# Patient Record
Sex: FEMALE | Race: BLACK OR AFRICAN AMERICAN | Employment: UNEMPLOYED | URBAN - METROPOLITAN AREA
[De-identification: names, ages, dates, MRNs, and addresses within clinical notes are randomized per-mention and may not be internally consistent; named-entity substitution may affect disease eponyms.]

---

## 2017-12-17 ENCOUNTER — HOSPITAL ENCOUNTER (EMERGENCY)
Age: 52
Discharge: HOME | End: 2017-12-17
Attending: EMERGENCY MEDICINE
Payer: MEDICARE

## 2017-12-17 VITALS
DIASTOLIC BLOOD PRESSURE: 140 MMHG | RESPIRATION RATE: 20 BRPM | SYSTOLIC BLOOD PRESSURE: 172 MMHG | OXYGEN SATURATION: 99 % | TEMPERATURE: 98.4 F | HEART RATE: 92 BPM

## 2017-12-17 DIAGNOSIS — R51.9 ACUTE INTRACTABLE HEADACHE, UNSPECIFIED HEADACHE TYPE: Primary | ICD-10-CM
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
## 2017-12-17 DIAGNOSIS — I10 HYPERTENSION, UNSPECIFIED TYPE: ICD-10-CM

## 2017-12-17 PROCEDURE — 99284 EMERGENCY DEPT VISIT MOD MDM: CPT

## 2017-12-17 PROCEDURE — 6370000000 HC RX 637 (ALT 250 FOR IP): Performed by: EMERGENCY MEDICINE

## 2017-12-17 RX ORDER — ACETAMINOPHEN 325 MG/1
650 TABLET ORAL ONCE
Status: COMPLETED | OUTPATIENT
Start: 2017-12-17 | End: 2017-12-17

## 2017-12-17 RX ADMIN — ACETAMINOPHEN 650 MG: 325 TABLET ORAL at 07:29

## 2017-12-17 ASSESSMENT — PAIN DESCRIPTION - PAIN TYPE: TYPE: ACUTE PAIN

## 2017-12-17 ASSESSMENT — PAIN DESCRIPTION - LOCATION: LOCATION: HEAD

## 2017-12-17 ASSESSMENT — PAIN SCALES - GENERAL
PAINLEVEL_OUTOF10: 8
PAINLEVEL_OUTOF10: 8

## 2017-12-17 NOTE — PROGRESS NOTES
I did not see or evaluate this patient. I clicked on this chart in [address].     Jordana Ontiveros DO  Emergency Medicine Resident

## 2017-12-17 NOTE — ED NOTES
Per [facility], [facility] doesn't have January schedule & [facility] scheduling out until late January. Writer provided patient [facility] clinic appointment card, advised patient to contact them directly for earlier appointment. Patient denies additional needs.       Valente HEIKE [place], [place]  12/17/17 0188

## 2017-12-17 NOTE — ED PROVIDER NOTES
[facility]  ED  Emergency Department Encounter  Emergency Medicine Resident     Pt Name: Katiana Santa  MRN: [id number]  [place] 1965  Date of evaluation: 12/17/17  PCP:  No primary care provider on file. CHIEF COMPLAINT       Chief Complaint   Patient presents with    Headache     posterior head pain s/p slip and fall on ice at a gas station. Pt reports hitting the back of her head a metal pole. No LOC       HISTORY OF PRESENT ILLNESS  (Location/Symptom, Timing/Onset, Context/Setting, Quality, Duration, Modifying Factors, Severity.)      Katiana Santa is a 46 y.o. female who presents with an acute mechanical fall that happened an hours ago. Patient states that they tripped on ice causing them to hit the back of the head. Denied loss of consciousness, chest pain, shortness of breath, palpitations, nausea, vomiting, numbness, tingling, burning, weakness, headaches, blurred vision, being on blood thinners. Since the fall patient complains of mild sharp pain  At the location of where she hit her head that is has a constant baseline component with acute intermittent flares is aggravated by pressure or movement and relieved by nothing. Patient has taken nothing without adequate relief. Patient has ambulated. Patient denies not acting appropriate. Headache is mild at onset and the maximum initially    PAST MEDICAL / SURGICAL / SOCIAL / FAMILY HISTORY     I reviewed the past medical history and past surgical history and is non contributory unless noted above does take daily ASA      Social History     Social History    Marital status: Single     Spouse name: N/A    Number of children: N/A    Years of education: N/A     Occupational History    Not on file.      Social History Main Topics    Smoking status: Not on file    Smokeless tobacco: Not on file    Alcohol use No    Drug use: No    Sexual activity: Not on file     Other Topics Concern    Not on file     Social History and Oriented x3,   Skin: Warm and dry  Psych: Mood/affect normal, behavior normal    DIFFERENTIAL  DIAGNOSIS     PLAN (LABS / IMAGING / EKG):  Orders Placed This Encounter   Procedures    Inpatient consult to Social Work       MEDICATIONS ORDERED:  Orders Placed This Encounter   Medications    acetaminophen (TYLENOL) tablet 650 mg       DDX: Fracture, sprain, strain    DIAGNOSTIC RESULTS / EMERGENCY DEPARTMENT COURSE / MDM     LABS:  No results found for this visit on 12/17/17. IMPRESSION: Mechanical Fall    RADIOLOGY:  No results found. EKG  None     All EKG's are interpreted by the Emergency Department Physician who either signs or Co-signs this chart in the absence of a cardiologist.    EMERGENCY DEPARTMENT COURSE:  Pre-hypertension/Hypertension: You have been informed that you may have pre-hypertension or Hypertension based on a blood pressure reading in the emergency department. I recommend you call the primary care provider listed on your discharge instructions or a physician of your choice this week to arrange follow up for further evaluation of possible pre-hypertension or Hypertension. Social work consult to the patient having a primary care physician found to have hypertension. Social work saw evaluated and treated patient gave her PCP follow-up    I treated the patient's pain with Tylenol or strict return precautions including if she having difficulty with words having difficulty walking having nausea vomiting. I gave her concussion information    [place] [facility]   Imaging is not required since GCS is not <15, there is not suspected open or depressed skull fracture or signs of basilar skull fracture, >2 episodes of vomiting, and age between 13-73. This test is almost 100% sensitive for identifying head injuries that require neurosurgical intervention. There is still a slight risk but these patients continued to have worsening of symptoms and returned back to the department.  Thus it is important for you to return if you have a worsening of headache, nausea, vomiting, changes in vision or motor or sensory weakness. PROCEDURES:  None    CONSULTS:  IP CONSULT TO SOCIAL WORK    CRITICAL CARE:  None    FINAL IMPRESSION      1. Acute intractable headache, unspecified headache type    2. Accident due to mechanical fall without injury, initial encounter    3. Hypertension, unspecified type          DISPOSITION / PLAN       Use an ice pack or bag filled with ice and apply to the injured area 3 - 4 times a day for 15 - 20 minutes each time. If the injury is older than 3 days, then use a heating pad to help relax the muscles. Use ibuprofen or Tylenol (unless prescribed medications that have Tylenol in it) for pain. You can take over the counter Ibuprofen (advil) tablets (4 every 8 hours or 3 every 6 hours or 2 every 4 hours)    Return to the Emergency Department for inability to move muscle, worsening of pain, tingling / loss of sensation, any other care or concern.       DISPOSITION Decision to Discharge    PATIENT REFERRED TO:  [facility]  [address] [address]  [phone]    If symptoms worsen    see pcp list            DISCHARGE MEDICATIONS:  New Prescriptions    No medications on file       Patricio Parisi MD  Emergency Medicine Resident    (Please note that portions of this note were completed with a voice recognition program.  Efforts were made to edit the dictations but occasionally words are mis-transcribed.)        Patricio Parisi MD  Resident  12/17/17 0863

## 2017-12-17 NOTE — ED PROVIDER NOTES
[facility]     Emergency Department     Faculty Attestation    I performed a history and physical examination of the patient and discussed management with the resident. I reviewed the residents note and agree with the documented findings and plan of care. Any areas of disagreement are noted on the chart. I was personally present for the key portions of any procedures. I have documented in the chart those procedures where I was not present during the key portions. I have reviewed the emergency nurses triage note. I agree with the chief complaint, past medical history, past surgical history, allergies, medications, social and family history as documented unless otherwise noted below. For Physician Assistant/ Nurse Practitioner cases/documentation I have personally evaluated this patient and have completed at least one if not all key elements of the E/M (history, physical exam, and MDM). Additional findings are as noted. Patient fell striking her head, no loss of consciousness, no signs of basilar skull fracture, no hemotympanum, no midline C-spine pain, cranial nerves intact, cerebellar testing normal.  Small hematoma on the top of the scalp.   Laron Saleh MD  Attending Emergency  Physician              Beulah Taveras MD  12/17/17 3776